# Patient Record
Sex: FEMALE | Race: WHITE
[De-identification: names, ages, dates, MRNs, and addresses within clinical notes are randomized per-mention and may not be internally consistent; named-entity substitution may affect disease eponyms.]

---

## 2020-11-05 ENCOUNTER — HOSPITAL ENCOUNTER (EMERGENCY)
Dept: HOSPITAL 53 - M ED | Age: 35
LOS: 1 days | Discharge: HOME | End: 2020-11-06
Payer: COMMERCIAL

## 2020-11-05 VITALS — WEIGHT: 201.5 LBS | HEIGHT: 64 IN | BODY MASS INDEX: 34.4 KG/M2

## 2020-11-05 DIAGNOSIS — K85.90: Primary | ICD-10-CM

## 2020-11-05 DIAGNOSIS — K52.9: ICD-10-CM

## 2020-11-05 DIAGNOSIS — F41.9: ICD-10-CM

## 2020-11-05 DIAGNOSIS — F17.200: ICD-10-CM

## 2020-11-05 DIAGNOSIS — K76.89: ICD-10-CM

## 2020-11-05 DIAGNOSIS — M25.511: ICD-10-CM

## 2020-11-05 DIAGNOSIS — Z79.899: ICD-10-CM

## 2020-11-05 PROCEDURE — 83690 ASSAY OF LIPASE: CPT

## 2020-11-05 PROCEDURE — 80076 HEPATIC FUNCTION PANEL: CPT

## 2020-11-05 PROCEDURE — 84443 ASSAY THYROID STIM HORMONE: CPT

## 2020-11-05 PROCEDURE — 85025 COMPLETE CBC W/AUTO DIFF WBC: CPT

## 2020-11-05 PROCEDURE — 96374 THER/PROPH/DIAG INJ IV PUSH: CPT

## 2020-11-05 PROCEDURE — 74177 CT ABD & PELVIS W/CONTRAST: CPT

## 2020-11-05 PROCEDURE — 80047 BASIC METABLC PNL IONIZED CA: CPT

## 2020-11-05 PROCEDURE — 84484 ASSAY OF TROPONIN QUANT: CPT

## 2020-11-05 PROCEDURE — 82553 CREATINE MB FRACTION: CPT

## 2020-11-05 PROCEDURE — 99284 EMERGENCY DEPT VISIT MOD MDM: CPT

## 2020-11-05 PROCEDURE — 84439 ASSAY OF FREE THYROXINE: CPT

## 2020-11-05 PROCEDURE — 83880 ASSAY OF NATRIURETIC PEPTIDE: CPT

## 2020-11-05 PROCEDURE — 93005 ELECTROCARDIOGRAM TRACING: CPT

## 2020-11-05 PROCEDURE — 82550 ASSAY OF CK (CPK): CPT

## 2020-11-05 PROCEDURE — 84702 CHORIONIC GONADOTROPIN TEST: CPT

## 2020-11-06 VITALS — SYSTOLIC BLOOD PRESSURE: 115 MMHG | DIASTOLIC BLOOD PRESSURE: 71 MMHG

## 2020-11-06 LAB
ALBUMIN SERPL BCG-MCNC: 3.8 GM/DL (ref 3.2–5.2)
ALT SERPL W P-5'-P-CCNC: 17 U/L (ref 12–78)
BASOPHILS # BLD AUTO: 0 10^3/UL (ref 0–0.2)
BASOPHILS NFR BLD AUTO: 0.1 % (ref 0–1)
BILIRUB CONJ SERPL-MCNC: < 0.1 MG/DL (ref 0–0.2)
BILIRUB SERPL-MCNC: 0.1 MG/DL (ref 0.2–1)
CK MB CFR.DF SERPL CALC: 1.59
CK MB SERPL-MCNC: < 1 NG/ML (ref ?–3.6)
CK SERPL-CCNC: 63 U/L (ref 26–192)
EOSINOPHIL # BLD AUTO: 0 10^3/UL (ref 0–0.5)
EOSINOPHIL NFR BLD AUTO: 0 % (ref 0–3)
HCT VFR BLD AUTO: 41.9 % (ref 36–47)
HGB BLD-MCNC: 13.4 G/DL (ref 12–15.5)
LIPASE SERPL-CCNC: 426 U/L (ref 73–393)
LYMPHOCYTES # BLD AUTO: 2.9 10^3/UL (ref 1.5–5)
LYMPHOCYTES NFR BLD AUTO: 36.8 % (ref 24–44)
MCH RBC QN AUTO: 29.1 PG (ref 27–33)
MCHC RBC AUTO-ENTMCNC: 32 G/DL (ref 32–36.5)
MCV RBC AUTO: 90.9 FL (ref 80–96)
MONOCYTES # BLD AUTO: 0.7 10^3/UL (ref 0–0.8)
MONOCYTES NFR BLD AUTO: 8.5 % (ref 0–5)
NEUTROPHILS # BLD AUTO: 4.2 10^3/UL (ref 1.5–8.5)
NEUTROPHILS NFR BLD AUTO: 54.3 % (ref 36–66)
NT-PRO BNP: 16 PG/ML (ref ?–125)
PLATELET # BLD AUTO: 241 10^3/UL (ref 150–450)
PROT SERPL-MCNC: 6.8 GM/DL (ref 6.4–8.2)
RBC # BLD AUTO: 4.61 10^6/UL (ref 4–5.4)
T4 FREE SERPL-MCNC: 1.05 NG/DL (ref 0.76–1.46)
TROPONIN I SERPL-MCNC: < 0.02 NG/ML (ref ?–0.1)
TSH SERPL DL<=0.005 MIU/L-ACNC: 2.97 UIU/ML (ref 0.36–3.74)
WBC # BLD AUTO: 7.8 10^3/UL (ref 4–10)

## 2020-11-06 NOTE — ECGEPIP
[facility] - ED

                                       

                                       Test Date:    2020

Pat Name:     MARISEL MARTIN            Department:   

Patient ID:   [id number]                 Room:         -

Gender:       Female                   Technician:   s

:          1985               Requested By: MANDY JOHNSTON PA-C

Order Number: [id number]     Reading MD:   Marisel Díaz

                                 Measurements

Intervals                              Axis          

Rate:         68                       P:            23

HI:           143                      QRS:          63

QRSD:         88                       T:            30

QT:           384                                    

QTc:          409                                    

                           Interpretive Statements

SINUS RHYTHM

No prior

Electronically Signed on 2020 14:01:29 EST by Marisel Díaz

## 2020-11-06 NOTE — REPVR
PROCEDURE INFORMATION: 

Exam: CT Abdomen And Pelvis With Contrast 

Exam date and time: 11/6/2020 1:09 AM 

Age: 35 years old 

Clinical indication: Pain; Other: Scapular; Additional info: Elevated lipase 

with scapular pain 



TECHNIQUE: 

Imaging protocol: Computed tomography of the abdomen and pelvis with 

intravenous contrast. 

Radiation optimization: All CT scans at this facility use at least one of these 

dose optimization techniques: automated exposure control; mA and/or kV 

adjustment per patient size (includes targeted exams where dose is matched to 

clinical indication); or iterative reconstruction. 

Contrast material: ISO; Contrast volume: 100 ml; Contrast route: INTRAVENOUS 

(IV);  



COMPARISON: 

No relevant prior studies available. 



FINDINGS: 

Liver: Hypervascular lesion in the left hepatic lobe measuring 1.2 x 1.2 x 1.5 

cm. 

Gallbladder and bile ducts: Normal. No calcified stones. No ductal dilation. 

Pancreas: Normal. No ductal dilation. 

Spleen: Normal. No splenomegaly. 

Adrenal glands: Normal. No mass. 

Kidneys and ureters: Normal. No hydronephrosis. 

Stomach and bowel: Diffuse thickening of the colon. No abnormal bowel 

dilatation. Negative for colonic diverticulitis. 

Appendix: Appendix is normal. 



Intraperitoneal space: Unremarkable. No free air. No significant fluid 

collection. 

Vasculature: Unremarkable. No abdominal aortic aneurysm. 

Lymph nodes: Unremarkable. No enlarged lymph nodes. 

Urinary bladder: Unremarkable as visualized. 

Reproductive: Uterus is normal. 

Bones/joints: Unremarkable. No acute fracture. 

Soft tissues: Unremarkable. 



IMPRESSION: 

1. Diffuse thickening of the colon. Consistent with infectious or inflammatory 

colitis. 

2. Hypervascular lesion in the left hepatic lobe. In a low-risk patient, this 

lesion is most likely to be benign and no further follow-up is recommended. In 

a high-risk patient, recommend further evaluation with liver MRI. 



Electronically signed by: Lonnie Blum On 11/06/2020  02:47:35 AM

## 2020-11-10 ENCOUNTER — HOSPITAL ENCOUNTER (OUTPATIENT)
Dept: HOSPITAL 53 - M LAB | Age: 35
End: 2020-11-10
Attending: NURSE PRACTITIONER
Payer: COMMERCIAL

## 2020-11-10 DIAGNOSIS — R10.11: Primary | ICD-10-CM

## 2020-11-10 DIAGNOSIS — R74.8: ICD-10-CM

## 2020-11-10 DIAGNOSIS — R19.7: ICD-10-CM

## 2020-11-10 LAB
ALBUMIN SERPL BCG-MCNC: 4.1 GM/DL (ref 3.2–5.2)
ALT SERPL W P-5'-P-CCNC: 16 U/L (ref 12–78)
BASOPHILS # BLD AUTO: 0 10^3/UL (ref 0–0.2)
BASOPHILS NFR BLD AUTO: 0.1 % (ref 0–1)
BILIRUB SERPL-MCNC: 0.3 MG/DL (ref 0.2–1)
BUN SERPL-MCNC: 12 MG/DL (ref 7–18)
CALCIUM SERPL-MCNC: 9.1 MG/DL (ref 8.5–10.1)
CHLORIDE SERPL-SCNC: 108 MEQ/L (ref 98–107)
CO2 SERPL-SCNC: 29 MEQ/L (ref 21–32)
CREAT SERPL-MCNC: 0.9 MG/DL (ref 0.55–1.3)
EOSINOPHIL # BLD AUTO: 0 10^3/UL (ref 0–0.5)
EOSINOPHIL NFR BLD AUTO: 0 % (ref 0–3)
GFR SERPL CREATININE-BSD FRML MDRD: > 60 ML/MIN/{1.73_M2} (ref 60–?)
GLUCOSE SERPL-MCNC: 78 MG/DL (ref 70–100)
HCT VFR BLD AUTO: 44.7 % (ref 36–47)
HGB BLD-MCNC: 14.8 G/DL (ref 12–15.5)
LIPASE SERPL-CCNC: 91 U/L (ref 73–393)
LYMPHOCYTES # BLD AUTO: 2.3 10^3/UL (ref 1.5–5)
LYMPHOCYTES NFR BLD AUTO: 32.5 % (ref 24–44)
MCH RBC QN AUTO: 30.5 PG (ref 27–33)
MCHC RBC AUTO-ENTMCNC: 33.1 G/DL (ref 32–36.5)
MCV RBC AUTO: 92.2 FL (ref 80–96)
MONOCYTES # BLD AUTO: 0.4 10^3/UL (ref 0–0.8)
MONOCYTES NFR BLD AUTO: 5.2 % (ref 0–5)
NEUTROPHILS # BLD AUTO: 4.4 10^3/UL (ref 1.5–8.5)
NEUTROPHILS NFR BLD AUTO: 61.9 % (ref 36–66)
PLATELET # BLD AUTO: 253 10^3/UL (ref 150–450)
POTASSIUM SERPL-SCNC: 4.1 MEQ/L (ref 3.5–5.1)
PROT SERPL-MCNC: 6.8 GM/DL (ref 6.4–8.2)
RBC # BLD AUTO: 4.85 10^6/UL (ref 4–5.4)
SODIUM SERPL-SCNC: 141 MEQ/L (ref 136–145)
WBC # BLD AUTO: 7.1 10^3/UL (ref 4–10)

## 2020-11-11 ENCOUNTER — HOSPITAL ENCOUNTER (OUTPATIENT)
Dept: HOSPITAL 53 - M WHC | Age: 35
End: 2020-11-11
Attending: NURSE PRACTITIONER
Payer: COMMERCIAL

## 2020-11-11 DIAGNOSIS — R10.11: Primary | ICD-10-CM

## 2020-11-13 ENCOUNTER — HOSPITAL ENCOUNTER (OUTPATIENT)
Dept: HOSPITAL 53 - M LAB REF | Age: 35
End: 2020-11-13
Attending: NURSE PRACTITIONER
Payer: COMMERCIAL

## 2020-11-13 DIAGNOSIS — R10.11: Primary | ICD-10-CM

## 2020-11-13 DIAGNOSIS — R19.7: ICD-10-CM

## 2020-11-13 DIAGNOSIS — R74.8: ICD-10-CM

## 2020-11-13 NOTE — REP
INDICATION:

R10.11 RUQ PAIN



COMPARISON:

CT dated 11/06/2020



TECHNIQUE:

Real time gray scale ultrasound examination using curved array transducer.



FINDINGS:

Liver includes 1.7 x 1.6 x 1.2 cm hypoechoic lesion in the left lobe consistent with

the finding on CT and likely representing hemangioma.  Liver is otherwise normal in

contour, size, and echogenicity measuring 15 cm in craniocaudal length.



Pancreas is incompletely evaluated due to interposed bowel gas.



The gallbladder is normal and without gallstones, wall thickening, or pericholecystic

fluid.  No biliary ductal dilatation is appreciated and the common bile duct measures

3.0 mm diameter.



Right kidney is normal in reniform shape without hydronephrosis and measures 10.2 x

5.2 x 4.1 cm.



No ascites in the visualized right upper quadrant.  Abdominal aorta appears normal and

measures 1.9 cm maximal diameter.











IMPRESSION:

1. Hepatic lesion likely representing hemangioma and consistent with finding on CT.





<Electronically signed by Aubrey Lugo > 11/13/20 [id number]

## 2020-11-25 ENCOUNTER — HOSPITAL ENCOUNTER (OUTPATIENT)
Dept: HOSPITAL 53 - M OPP | Age: 35
Discharge: HOME | End: 2020-11-25
Attending: SURGERY
Payer: COMMERCIAL

## 2020-11-25 VITALS — DIASTOLIC BLOOD PRESSURE: 86 MMHG | SYSTOLIC BLOOD PRESSURE: 150 MMHG

## 2020-11-25 VITALS — BODY MASS INDEX: 34.34 KG/M2 | WEIGHT: 193.8 LBS | HEIGHT: 63 IN

## 2020-11-25 DIAGNOSIS — R19.7: ICD-10-CM

## 2020-11-25 DIAGNOSIS — Z87.891: ICD-10-CM

## 2020-11-25 DIAGNOSIS — K92.1: ICD-10-CM

## 2020-11-25 DIAGNOSIS — R10.11: ICD-10-CM

## 2020-11-25 DIAGNOSIS — R74.8: ICD-10-CM

## 2020-11-25 DIAGNOSIS — Z79.899: ICD-10-CM

## 2020-11-25 DIAGNOSIS — K64.0: Primary | ICD-10-CM

## 2020-11-25 NOTE — ROOR
________________________________________________________________________________

Patient Name: Marisel Pierre            Procedure Date: 11/25/2020 11:54 AM

MRN: [id number]                          Account Number: [id number]

YOB: 1985               Age: 35

Room: [facility]                            Gender: Female

Note Status: Finalized                 

________________________________________________________________________________

 

Procedure:            Colonoscopy

Indications:          Hematochezia

Providers:            Hang Perry DO

Referring MD:         Mini WORKMAN DO

Requesting Provider:  

Medicines:            Propofol per Anesthesia

Complications:        No immediate complications.

________________________________________________________________________________

Procedure:            Pre-Anesthesia Assessment:

                      - Prior to the procedure, a History and Physical was 

                      performed, and patient medications and allergies were 

                      reviewed. The patient is competent. The risks and 

                      benefits of the procedure and the sedation options and 

                      risks were discussed with the patient. All questions 

                      were answered and informed consent was obtained. Patient 

                      identification and proposed procedure were verified by 

                      the physician, the nurse, the anesthetist and the 

                      technician in the endoscopy suite. Mental Status 

                      Examination: alert and oriented. Airway Examination: 

                      normal oropharyngeal airway and neck mobility. 

                      Respiratory Examination: clear to auscultation. CV 

                      Examination: normal. Prophylactic Antibiotics: The 

                      patient does not require prophylactic antibiotics. Prior 

                      Anticoagulants: The patient has taken no previous 

                      anticoagulant or antiplatelet agents. ASA Grade 

                      Assessment: II - A patient with mild systemic disease. 

                      After reviewing the risks and benefits, the patient was 

                      deemed in satisfactory condition to undergo the 

                      procedure. The anesthesia plan was to use monitored 

                      anesthesia care (MAC). Immediately prior to 

                      administration of medications, the patient was 

                      re-assessed for adequacy to receive sedatives. The heart 

                      rate, respiratory rate, oxygen saturations, blood 

                      pressure, adequacy of pulmonary ventilation, and 

                      response to care were monitored throughout the 

                      procedure. The physical status of the patient was 

                      re-assessed after the procedure.

                      The Colonoscope was introduced through the anus and 

                      advanced to the cecum, identified by appendiceal orifice 

                      and ileocecal valve. The colonoscopy was performed 

                      without difficulty. The patient tolerated the procedure 

                      well.

                                                                                

Findings:

     Non-bleeding internal hemorrhoids were found during retroflexion. The 

     hemorrhoids were mild and Grade I (internal hemorrhoids that do not 

     prolapse).

     The exam was otherwise without abnormality.

                                                                                

Impression:           - Non-bleeding internal hemorrhoids.

                      - The examination was otherwise normal.

                      - No specimens collected.

Recommendation:       - Patient has a contact number available for 

                      emergencies. The signs and symptoms of potential delayed 

                      complications were discussed with the patient. Return to 

                      normal activities tomorrow. Written discharge 

                      instructions were provided to the patient.

                      - Repeat colonoscopy in 5-10 years for screening 

                      purposes.

                      - Return to my office PRN.

                                                                                

Procedure Code(s):    --- Professional ---

                      83870, Colonoscopy, flexible; diagnostic, including 

                      collection of specimen(s) by brushing or washing, when 

                      performed (separate procedure)

Diagnosis Code(s):    --- Professional ---

                      K64.0, First degree hemorrhoids

                      K92.1, Melena (includes Hematochezia)

 

CPT copyright 2019 [employer]. All rights reserved.

 

The codes documented in this report are preliminary and upon  review may 

be revised to meet current compliance requirements.

 

_________________

Hang Perry DO

11/25/2020 12:16:15 PM

Electronically signed by Hang Perry DO

Number of Addenda: 0

 

Note Initiated On: 11/25/2020 11:54 AM

Estimated Blood Loss: Estimated blood loss: none.

## 2021-04-21 ENCOUNTER — HOSPITAL ENCOUNTER (OUTPATIENT)
Dept: HOSPITAL 53 - M LABSMTC | Age: 36
End: 2021-04-21
Attending: PEDIATRICS
Payer: SELF-PAY

## 2021-04-21 DIAGNOSIS — Z20.822: Primary | ICD-10-CM

## 2021-06-29 ENCOUNTER — HOSPITAL ENCOUNTER (OUTPATIENT)
Dept: HOSPITAL 53 - M PLAIMG | Age: 36
End: 2021-06-29
Attending: PHYSICIAN ASSISTANT
Payer: COMMERCIAL

## 2021-06-29 DIAGNOSIS — M54.31: Primary | ICD-10-CM

## 2021-06-29 NOTE — REP
INDICATION:

SCIATICA, RIHGT SIDE.



COMPARISON:

None.



TECHNIQUE:

Eight views of the lumbar spine are provided including lateral views done in flexion

and extension.



FINDINGS:

Lumbar vertebral body heights are preserved.  No subluxation or instability is seen on

flexion extension lateral views.  There is no evidence of spondylolysis or

spondylolisthesis.  Pedicles and posterior elements are intact.  There is minimal

discogenic spurring anteriorly at the L3-4 disc and there is slight degenerative

narrowing of the L4-5 disc.  Other disc spaces are maintained.  Facet joints are

normally aligned.  Sacrum and SI joints appear intact.  Psoas margins are symmetric.



IMPRESSION:

Minimal degenerative disc changes at L3-4 and L4-5.  No subluxation or instability.

Otherwise negative lumbar spine radiographs.





<Electronically signed by Mo Clifford > 06/29/21 [id number]

## 2021-07-06 ENCOUNTER — HOSPITAL ENCOUNTER (EMERGENCY)
Dept: HOSPITAL 53 - M ED | Age: 36
Discharge: HOME | End: 2021-07-06
Payer: COMMERCIAL

## 2021-07-06 VITALS — HEIGHT: 63 IN | BODY MASS INDEX: 30.08 KG/M2 | WEIGHT: 169.76 LBS

## 2021-07-06 VITALS — SYSTOLIC BLOOD PRESSURE: 116 MMHG | DIASTOLIC BLOOD PRESSURE: 66 MMHG

## 2021-07-06 DIAGNOSIS — Z79.899: ICD-10-CM

## 2021-07-06 DIAGNOSIS — M51.16: Primary | ICD-10-CM

## 2021-07-06 DIAGNOSIS — F17.290: ICD-10-CM

## 2021-07-06 PROCEDURE — 72148 MRI LUMBAR SPINE W/O DYE: CPT

## 2021-07-06 PROCEDURE — 96374 THER/PROPH/DIAG INJ IV PUSH: CPT

## 2021-07-06 PROCEDURE — 99284 EMERGENCY DEPT VISIT MOD MDM: CPT

## 2021-07-06 PROCEDURE — 96375 TX/PRO/DX INJ NEW DRUG ADDON: CPT

## 2021-07-06 NOTE — REPVR
PROCEDURE INFORMATION: 

Exam: MR Lumbar Spine Without Contrast 

Exam date and time: 7/6/2021 8:32 AM 

Age: 36 years old 

Clinical indication: Other: Right l3/4 radicular pain 



TECHNIQUE: 

Imaging protocol: Multiplanar magnetic resonance images of the lumbar spine 

without intravenous contrast. 



COMPARISON: 

CR Spine,LS wBENDING MIN 6 VIEWS 6/29/2021 3:20 PM 



FINDINGS: 

Vertebrae: Vertebral body height and alignment are normal. There is no disc 

desiccation and disc heights are maintained. Vertebral body marrow signal is 

unremarkable. 

Spinal cord: Conus terminates at L1 and appears normal in signal intensity 

without intrinsic or extrinsic lesion. 

L1-L2: There is no significant disc bulge or protrusion. Mild facet 

degeneration. There is no significant spinal stenosis or neural foraminal 

narrowing. 

L2-L3: There is no significant disc bulge or protrusion. Mild facet 

degeneration. There is no significant spinal stenosis or neural foraminal 

narrowing. 

L3-L4: There is mild disc bulge without focal disc protrusion. Mild facet 

degeneration. There is no significant spinal stenosis or neural foraminal 

narrowing. 

L4-L5: There is mild disc bulge without focal disc protrusion. Mild facet 

degeneration. There is no significant spinal stenosis or neural foraminal 

narrowing. 

L5-S1: There is mild disc bulge and approximately 1 mm central protrusion. Mild 

facet degeneration. There is no significant spinal stenosis or neural foraminal 

narrowing. 

Soft tissues: Unremarkable. 



IMPRESSION: 

Mild degenerative changes. No spinal stenosis, neural foraminal narrowing, or 

evidence of nerve root impingement. 



Electronically signed by: Aline Roberto On 07/06/2021  08:49:56 AM

## 2021-07-16 ENCOUNTER — HOSPITAL ENCOUNTER (OUTPATIENT)
Dept: HOSPITAL 53 - M PLALAB | Age: 36
End: 2021-07-16
Attending: ORTHOPAEDIC SURGERY
Payer: COMMERCIAL

## 2021-07-16 DIAGNOSIS — M54.5: Primary | ICD-10-CM

## 2021-07-16 LAB
BASOPHILS # BLD AUTO: 0 10^3/UL (ref 0–0.2)
BASOPHILS NFR BLD AUTO: 0.3 % (ref 0–1)
CRP SERPL-MCNC: < 0.3 MG/DL (ref 0–0.3)
EOSINOPHIL # BLD AUTO: 0.1 10^3/UL (ref 0–0.5)
EOSINOPHIL NFR BLD AUTO: 1.7 % (ref 0–3)
ERYTHROCYTE [SEDIMENTATION RATE] IN BLOOD BY WESTERGREN METHOD: 8 MM/HR (ref 0–20)
HCT VFR BLD AUTO: 40.2 % (ref 36–47)
HGB BLD-MCNC: 13.2 G/DL (ref 12–15.5)
LYMPHOCYTES # BLD AUTO: 2.1 10^3/UL (ref 1.5–5)
LYMPHOCYTES NFR BLD AUTO: 34.3 % (ref 24–44)
MCH RBC QN AUTO: 30.1 PG (ref 27–33)
MCHC RBC AUTO-ENTMCNC: 32.8 G/DL (ref 32–36.5)
MCV RBC AUTO: 91.6 FL (ref 80–96)
MONOCYTES # BLD AUTO: 0.4 10^3/UL (ref 0–0.8)
MONOCYTES NFR BLD AUTO: 6 % (ref 2–8)
NEUTROPHILS # BLD AUTO: 3.5 10^3/UL (ref 1.5–8.5)
NEUTROPHILS NFR BLD AUTO: 57.5 % (ref 36–66)
PLATELET # BLD AUTO: 215 10^3/UL (ref 150–450)
RBC # BLD AUTO: 4.39 10^6/UL (ref 4–5.4)
RHEUMATOID FACT SERPL-ACNC: < 10 IU/ML (ref ?–15)
URATE SERPL-MCNC: 4.2 MG/DL (ref 2.6–6)
WBC # BLD AUTO: 6 10^3/UL (ref 4–10)

## 2021-07-19 LAB
B BURGDOR IGG+IGM SER-ACNC: <0.91 ISR (ref 0–0.9)
B BURGDOR IGM SER IA-ACNC: 0.97 INDEX (ref 0–0.79)

## 2021-12-21 ENCOUNTER — HOSPITAL ENCOUNTER (OUTPATIENT)
Dept: HOSPITAL 53 - M PLALAB | Age: 36
End: 2021-12-21
Attending: PHYSICIAN ASSISTANT
Payer: COMMERCIAL

## 2021-12-21 DIAGNOSIS — R63.4: Primary | ICD-10-CM

## 2021-12-21 LAB
ALBUMIN SERPL BCG-MCNC: 4.2 GM/DL (ref 3.2–5.2)
ALT SERPL W P-5'-P-CCNC: 18 U/L (ref 12–78)
BASOPHILS # BLD AUTO: 0 10^3/UL (ref 0–0.2)
BASOPHILS NFR BLD AUTO: 0.2 % (ref 0–1)
BILIRUB SERPL-MCNC: 0.3 MG/DL (ref 0.2–1)
BUN SERPL-MCNC: 12 MG/DL (ref 7–18)
CALCIUM SERPL-MCNC: 9.2 MG/DL (ref 8.5–10.1)
CHLORIDE SERPL-SCNC: 104 MEQ/L (ref 98–107)
CO2 SERPL-SCNC: 28 MEQ/L (ref 21–32)
CREAT SERPL-MCNC: 0.84 MG/DL (ref 0.55–1.3)
EOSINOPHIL # BLD AUTO: 0 10^3/UL (ref 0–0.5)
EOSINOPHIL NFR BLD AUTO: 0 % (ref 0–3)
GFR SERPL CREATININE-BSD FRML MDRD: > 60 ML/MIN/{1.73_M2} (ref 60–?)
GLUCOSE SERPL-MCNC: 79 MG/DL (ref 70–100)
HCT VFR BLD AUTO: 41.3 % (ref 36–47)
HGB BLD-MCNC: 13.9 G/DL (ref 12–15.5)
LDH SERPL L TO P-CCNC: 140 U/L (ref 84–246)
LYMPHOCYTES # BLD AUTO: 2.5 10^3/UL (ref 1.5–5)
LYMPHOCYTES NFR BLD AUTO: 30.5 % (ref 24–44)
MCH RBC QN AUTO: 30.8 PG (ref 27–33)
MCHC RBC AUTO-ENTMCNC: 33.7 G/DL (ref 32–36.5)
MCV RBC AUTO: 91.4 FL (ref 80–96)
MONOCYTES # BLD AUTO: 0.5 10^3/UL (ref 0–0.8)
MONOCYTES NFR BLD AUTO: 5.9 % (ref 2–8)
NEUTROPHILS # BLD AUTO: 5.2 10^3/UL (ref 1.5–8.5)
NEUTROPHILS NFR BLD AUTO: 63.2 % (ref 36–66)
PLATELET # BLD AUTO: 244 10^3/UL (ref 150–450)
POTASSIUM SERPL-SCNC: 3.5 MEQ/L (ref 3.5–5.1)
PROT SERPL-MCNC: 7.1 GM/DL (ref 6.4–8.2)
RBC # BLD AUTO: 4.52 10^6/UL (ref 4–5.4)
SODIUM SERPL-SCNC: 139 MEQ/L (ref 136–145)
T4 FREE SERPL-MCNC: 1.13 NG/DL (ref 0.76–1.46)
TSH SERPL DL<=0.005 MIU/L-ACNC: 1.04 UIU/ML (ref 0.36–3.74)
WBC # BLD AUTO: 8.2 10^3/UL (ref 4–10)

## 2022-10-11 ENCOUNTER — HOSPITAL ENCOUNTER (OUTPATIENT)
Dept: HOSPITAL 53 - M PLAIMG | Age: 37
End: 2022-10-11
Attending: PHYSICIAN ASSISTANT
Payer: COMMERCIAL

## 2022-10-11 DIAGNOSIS — R07.81: Primary | ICD-10-CM

## 2022-12-05 ENCOUNTER — HOSPITAL ENCOUNTER (OUTPATIENT)
Dept: HOSPITAL 53 - M PLALAB | Age: 37
End: 2022-12-05
Attending: FAMILY MEDICINE
Payer: COMMERCIAL

## 2022-12-05 DIAGNOSIS — M35.7: Primary | ICD-10-CM

## 2022-12-05 LAB
ALBUMIN SERPL BCG-MCNC: 4 G/DL (ref 3.2–5.2)
ALP SERPL-CCNC: 46 U/L (ref 46–116)
ALT SERPL W P-5'-P-CCNC: 11 U/L (ref 7–40)
AST SERPL-CCNC: 15 U/L (ref ?–34)
BASOPHILS # BLD AUTO: 0 10^3/UL (ref 0–0.2)
BASOPHILS NFR BLD AUTO: 0.3 % (ref 0–1)
BILIRUB SERPL-MCNC: 0.2 MG/DL (ref 0.3–1.2)
BUN SERPL-MCNC: 15 MG/DL (ref 9–23)
CALCIUM SERPL-MCNC: 8.9 MG/DL (ref 8.5–10.1)
CHLORIDE SERPL-SCNC: 105 MMOL/L (ref 98–107)
CO2 SERPL-SCNC: 30 MMOL/L (ref 20–31)
CREAT SERPL-MCNC: 0.96 MG/DL (ref 0.55–1.3)
CRP SERPL-MCNC: 0.4 MG/DL (ref ?–1)
EOSINOPHIL # BLD AUTO: 0 10^3/UL (ref 0–0.5)
EOSINOPHIL NFR BLD AUTO: 0.3 % (ref 0–3)
ERYTHROCYTE [SEDIMENTATION RATE] IN BLOOD BY WESTERGREN METHOD: 7 MM/HR (ref 0–20)
GFR SERPL CREATININE-BSD FRML MDRD: > 60 ML/MIN/{1.73_M2} (ref 60–?)
GLUCOSE SERPL-MCNC: 82 MG/DL (ref 60–100)
HCT VFR BLD AUTO: 38.9 % (ref 36–47)
HGB BLD-MCNC: 12.9 G/DL (ref 12–15.5)
LYMPHOCYTES # BLD AUTO: 2.6 10^3/UL (ref 1.5–5)
LYMPHOCYTES NFR BLD AUTO: 33.2 % (ref 24–44)
MCH RBC QN AUTO: 31.3 PG (ref 27–33)
MCHC RBC AUTO-ENTMCNC: 33.2 G/DL (ref 32–36.5)
MCV RBC AUTO: 94.4 FL (ref 80–96)
MONOCYTES # BLD AUTO: 0.4 10^3/UL (ref 0–0.8)
MONOCYTES NFR BLD AUTO: 5.6 % (ref 2–8)
NEUTROPHILS # BLD AUTO: 4.8 10^3/UL (ref 1.5–8.5)
NEUTROPHILS NFR BLD AUTO: 60.5 % (ref 36–66)
PLATELET # BLD AUTO: 190 10^3/UL (ref 150–450)
POTASSIUM SERPL-SCNC: 4.5 MMOL/L (ref 3.5–5.1)
PROT SERPL-MCNC: 6.3 G/DL (ref 5.7–8.2)
RBC # BLD AUTO: 4.12 10^6/UL (ref 4–5.4)
RHEUMATOID FACT SERPL-ACNC: 4.8 IU/ML (ref ?–14)
SODIUM SERPL-SCNC: 141 MMOL/L (ref 136–145)
WBC # BLD AUTO: 7.9 10^3/UL (ref 4–10)

## 2022-12-12 ENCOUNTER — HOSPITAL ENCOUNTER (OUTPATIENT)
Dept: HOSPITAL 53 - M WHC | Age: 37
End: 2022-12-12
Attending: FAMILY MEDICINE
Payer: COMMERCIAL

## 2022-12-12 DIAGNOSIS — N83.202: Primary | ICD-10-CM

## 2023-01-24 ENCOUNTER — HOSPITAL ENCOUNTER (OUTPATIENT)
Dept: HOSPITAL 53 - M CARPUL | Age: 38
End: 2023-01-24
Attending: FAMILY MEDICINE
Payer: COMMERCIAL

## 2023-01-24 DIAGNOSIS — M35.7: Primary | ICD-10-CM

## 2023-02-07 ENCOUNTER — HOSPITAL ENCOUNTER (OUTPATIENT)
Dept: HOSPITAL 53 - M OPP | Age: 38
Discharge: HOME | End: 2023-02-07
Attending: INTERNAL MEDICINE
Payer: COMMERCIAL

## 2023-02-07 VITALS — WEIGHT: 149 LBS | HEIGHT: 63 IN | BODY MASS INDEX: 26.4 KG/M2

## 2023-02-07 VITALS — DIASTOLIC BLOOD PRESSURE: 63 MMHG | SYSTOLIC BLOOD PRESSURE: 100 MMHG

## 2023-02-07 DIAGNOSIS — K29.70: Primary | ICD-10-CM

## 2023-02-07 DIAGNOSIS — Z79.899: ICD-10-CM

## 2023-02-07 DIAGNOSIS — Z87.891: ICD-10-CM

## 2023-02-07 DIAGNOSIS — K44.9: ICD-10-CM

## 2023-02-07 DIAGNOSIS — G43.909: ICD-10-CM

## 2023-02-07 DIAGNOSIS — F63.3: ICD-10-CM

## 2023-02-07 DIAGNOSIS — Z79.1: ICD-10-CM

## 2023-02-07 DIAGNOSIS — R13.10: ICD-10-CM

## 2023-02-17 ENCOUNTER — HOSPITAL ENCOUNTER (OUTPATIENT)
Dept: HOSPITAL 53 - M PLALAB | Age: 38
End: 2023-02-17
Attending: FAMILY MEDICINE
Payer: COMMERCIAL

## 2023-02-17 DIAGNOSIS — M35.7: Primary | ICD-10-CM

## 2023-05-15 ENCOUNTER — HOSPITAL ENCOUNTER (OUTPATIENT)
Dept: HOSPITAL 53 - M PLALAB | Age: 38
End: 2023-05-15
Attending: FAMILY MEDICINE
Payer: COMMERCIAL

## 2023-05-15 DIAGNOSIS — M54.59: ICD-10-CM

## 2023-05-15 DIAGNOSIS — M35.7: Primary | ICD-10-CM

## 2023-05-15 DIAGNOSIS — M54.2: ICD-10-CM

## 2023-05-15 LAB
25(OH)D3 SERPL-MCNC: 37.5 NG/ML (ref 20–100)
CRP SERPL-MCNC: < 0.4 MG/DL (ref ?–1)
RHEUMATOID FACT SERPL-ACNC: 8 IU/ML (ref ?–14)
URATE SERPL-MCNC: 4.1 MG/DL (ref 3.1–7.8)

## 2023-05-17 LAB — CCP IGG SERPL-ACNC: 4 UNITS (ref 0–19)

## 2023-08-08 ENCOUNTER — HOSPITAL ENCOUNTER (OUTPATIENT)
Dept: HOSPITAL 53 - M PLALAB | Age: 38
End: 2023-08-08
Attending: STUDENT IN AN ORGANIZED HEALTH CARE EDUCATION/TRAINING PROGRAM
Payer: COMMERCIAL

## 2023-08-08 DIAGNOSIS — F60.5: ICD-10-CM

## 2023-08-08 DIAGNOSIS — F63.3: Primary | ICD-10-CM

## 2023-08-08 DIAGNOSIS — F41.1: ICD-10-CM

## 2023-08-08 LAB
CHOLEST SERPL-MCNC: 127 MG/DL (ref ?–200)
CHOLEST/HDLC SERPL: 1.63 {RATIO} (ref ?–5)
EST. AVERAGE GLUCOSE BLD GHB EST-MCNC: 91 MG/DL (ref 60–110)
HDLC SERPL-MCNC: 77.9 MG/DL (ref 40–?)
LDLC SERPL CALC-MCNC: 40.7 MG/DL (ref ?–100)
NONHDLC SERPL-MCNC: 49.1 MG/DL
TRIGL SERPL-MCNC: 42 MG/DL (ref ?–150)

## 2024-01-29 ENCOUNTER — HOSPITAL ENCOUNTER (OUTPATIENT)
Dept: HOSPITAL 53 - M PLAIMG | Age: 39
End: 2024-01-29
Attending: INTERNAL MEDICINE
Payer: COMMERCIAL

## 2024-01-29 DIAGNOSIS — G47.9: ICD-10-CM

## 2024-01-29 DIAGNOSIS — M25.50: Primary | ICD-10-CM

## 2024-01-29 DIAGNOSIS — I73.00: ICD-10-CM

## 2024-01-29 LAB
ALBUMIN SERPL BCG-MCNC: 4.4 G/DL (ref 3.2–5.2)
ALP SERPL-CCNC: 43 U/L (ref 46–116)
ALT SERPL W P-5'-P-CCNC: 12 U/L (ref 7–40)
AST SERPL-CCNC: 11 U/L (ref ?–34)
BASOPHILS # BLD AUTO: 0 10^3/UL (ref 0–0.2)
BASOPHILS NFR BLD AUTO: 0.3 % (ref 0–1)
BILIRUB SERPL-MCNC: 0.4 MG/DL (ref 0.3–1.2)
BUN SERPL-MCNC: 19 MG/DL (ref 9–23)
CALCIUM SERPL-MCNC: 9.6 MG/DL (ref 8.5–10.1)
CHLORIDE SERPL-SCNC: 106 MMOL/L (ref 98–107)
CO2 SERPL-SCNC: 29 MMOL/L (ref 20–31)
CREAT SERPL-MCNC: 0.88 MG/DL (ref 0.55–1.3)
CRP SERPL-MCNC: < 0.4 MG/DL (ref ?–1)
EOSINOPHIL # BLD AUTO: 0.1 10^3/UL (ref 0–0.5)
EOSINOPHIL NFR BLD AUTO: 0.9 % (ref 0–3)
ERYTHROCYTE [SEDIMENTATION RATE] IN BLOOD BY WESTERGREN METHOD: 9 MM/HR (ref 0–20)
GFR SERPL CREATININE-BSD FRML MDRD: > 60 ML/MIN/{1.73_M2} (ref 60–?)
GLUCOSE SERPL-MCNC: 91 MG/DL (ref 60–100)
HCT VFR BLD AUTO: 44.8 % (ref 36–47)
HGB BLD-MCNC: 14.8 G/DL (ref 12–15.5)
LYMPHOCYTES # BLD AUTO: 2 10^3/UL (ref 1.5–5)
LYMPHOCYTES NFR BLD AUTO: 33.8 % (ref 24–44)
MCH RBC QN AUTO: 31 PG (ref 27–33)
MCHC RBC AUTO-ENTMCNC: 33 G/DL (ref 32–36.5)
MCV RBC AUTO: 93.9 FL (ref 80–96)
MONOCYTES # BLD AUTO: 0.4 10^3/UL (ref 0–0.8)
MONOCYTES NFR BLD AUTO: 6 % (ref 2–8)
NEUTROPHILS # BLD AUTO: 3.4 10^3/UL (ref 1.5–8.5)
NEUTROPHILS NFR BLD AUTO: 58.7 % (ref 36–66)
PLATELET # BLD AUTO: 212 10^3/UL (ref 150–450)
POTASSIUM SERPL-SCNC: 4.7 MMOL/L (ref 3.5–5.1)
PROT SERPL-MCNC: 7.4 G/DL (ref 5.7–8.2)
RBC # BLD AUTO: 4.77 10^6/UL (ref 4–5.4)
SODIUM SERPL-SCNC: 138 MMOL/L (ref 136–145)
WBC # BLD AUTO: 5.9 10^3/UL (ref 4–10)